# Patient Record
Sex: MALE | Race: WHITE | Employment: FULL TIME | URBAN - METROPOLITAN AREA
[De-identification: names, ages, dates, MRNs, and addresses within clinical notes are randomized per-mention and may not be internally consistent; named-entity substitution may affect disease eponyms.]

---

## 2023-04-18 ENCOUNTER — HOSPITAL ENCOUNTER (EMERGENCY)
Age: 38
Discharge: HOME | End: 2023-04-18
Attending: EMERGENCY MEDICINE
Payer: COMMERCIAL

## 2023-04-18 VITALS
SYSTOLIC BLOOD PRESSURE: 183 MMHG | DIASTOLIC BLOOD PRESSURE: 122 MMHG | OXYGEN SATURATION: 97 % | TEMPERATURE: 97.7 F | HEART RATE: 87 BPM | WEIGHT: 315 LBS | RESPIRATION RATE: 18 BRPM

## 2023-04-18 DIAGNOSIS — K02.9 PAIN DUE TO DENTAL CARIES: Primary | ICD-10-CM

## 2023-04-18 PROCEDURE — 99283 EMERGENCY DEPT VISIT LOW MDM: CPT

## 2023-04-18 RX ORDER — ASPIRIN 325 MG
325 TABLET ORAL DAILY
COMMUNITY

## 2023-04-18 RX ORDER — MORPHINE SULFATE 15 MG/1
7.5 TABLET ORAL EVERY 6 HOURS PRN
Qty: 10 TABLET | Refills: 0 | Status: SHIPPED | OUTPATIENT
Start: 2023-04-18 | End: 2023-04-23

## 2023-04-18 RX ORDER — AMOXICILLIN 500 MG/1
500 CAPSULE ORAL 3 TIMES DAILY
Qty: 30 CAPSULE | Refills: 0 | Status: SHIPPED | OUTPATIENT
Start: 2023-04-18 | End: 2023-04-28

## 2023-04-18 ASSESSMENT — PAIN SCALES - GENERAL: PAINLEVEL_OUTOF10: 10

## 2023-04-18 ASSESSMENT — PAIN - FUNCTIONAL ASSESSMENT: PAIN_FUNCTIONAL_ASSESSMENT: 0-10

## 2023-04-18 NOTE — ED TRIAGE NOTES
Pt. A/ox4 and ambulatory to triage. Pt. C/o lower left dental pain that has progressively worsened since last night.

## 2023-04-18 NOTE — DISCHARGE INSTRUCTIONS
Tylenol 500 to 650 mg alternated with ibuprofen 600 mg every 3-4 hours for pain. Morphine 1/2 tablet every 6-8 hours as needed for severe breakthrough pain. Follow-up with dentist of your choice or one of the dentists below as soon as possible for definitive management of this dental problem. Dental Services     The Emergency Department is not able to provide dental services - tooth extractions, root canal. Though we can provide antibiotics for abccess or infection. Listed below are free or low-cost options. [facility], [facility]   [phone]  Tuesday and Thursday- registration starts at 10am. After registering you are given a time to return  Provides free x-rays, extractions, treatment of infection, and dental hygeine. Cannot have medicare, medicaid or other medical insurance coverage  Bring proof of [place]** residency (power bill, for example), Social Security Number and household income documents (including food stamps) as well as any current medications. (**if you do not live in [place], contact the free clinic in your home county for the availability of dental services)    [facility] [employer] [employer] [address] [place], [address]  [phone]  Monday and Wednesday 8a-5p  Tuesday and Thursday    8a-7p Friday 12-5p  Provides Adult and Childrens services. X-rays, extractions, treatment of infection, restorative care  Accepts medicaid, private insurance, self-pay.  Fees are on a sliding scale based on income (need to bring documentation)    [facility] [address] [facility] [place], [facility]     [phone]  Monday - Friday 8am-5p  Accepts medicaid for dental (but not dentures under 21)  Provides xrays, extractions, partials and dentures    [facility] [place], [address], [address] [phone]  Monday- Friday 9a-5p  First Come- First

## 2023-04-18 NOTE — ED PROVIDER NOTES
history on file. Social History     Socioeconomic History    Marital status: Single        Previous Medications    ASPIRIN 325 MG TABLET    Take 1 tablet by mouth daily    METFORMIN (GLUCOPHAGE) 1000 MG TABLET    Take 1 tablet by mouth 2 times daily (with meals)        No results found for any visits on 04/18/23. No orders to display                     Voice dictation software was used during the making of this note. This software is not perfect and grammatical and other typographical errors may be present. This note has not been completely proofread for errors.      Kai Almendarez MD  04/18/23 4309

## 2023-04-18 NOTE — ED NOTES
I have reviewed discharge instructions with the patient. The patient verbalized understanding. Patient left ED via Discharge Method: ambulatory to Home with (insert name of family/friend, self, transportself ). Opportunity for questions and clarification provided. Patient given 2 scripts. To continue your aftercare when you leave the hospital, you may receive an automated call from our care team to check in on how you are doing. This is a free service and part of our promise to provide the best care and service to meet your aftercare needs.  If you have questions, or wish to unsubscribe from this service please call [phone]. Thank you for Choosing our [facility] Emergency Department.        Andreas Fleischer, RN  04/18/23 6775